# Patient Record
Sex: MALE | Race: BLACK OR AFRICAN AMERICAN | NOT HISPANIC OR LATINO | Employment: UNEMPLOYED
[De-identification: names, ages, dates, MRNs, and addresses within clinical notes are randomized per-mention and may not be internally consistent; named-entity substitution may affect disease eponyms.]

---

## 2019-02-07 ENCOUNTER — CONTACT MOVED (OUTPATIENT)
Age: 39
End: 2019-02-07

## 2022-09-19 ENCOUNTER — HOSPITAL ENCOUNTER (EMERGENCY)
Facility: CLINIC | Age: 42
Discharge: HOME | End: 2022-09-19
Attending: STUDENT IN AN ORGANIZED HEALTH CARE EDUCATION/TRAINING PROGRAM | Admitting: STUDENT IN AN ORGANIZED HEALTH CARE EDUCATION/TRAINING PROGRAM
Payer: COMMERCIAL

## 2022-09-19 VITALS
HEART RATE: 90 BPM | SYSTOLIC BLOOD PRESSURE: 138 MMHG | DIASTOLIC BLOOD PRESSURE: 83 MMHG | OXYGEN SATURATION: 100 % | TEMPERATURE: 98.3 F | RESPIRATION RATE: 18 BRPM

## 2022-09-19 DIAGNOSIS — T40.2X1A OPIOID OVERDOSE, ACCIDENTAL OR UNINTENTIONAL, INITIAL ENCOUNTER (H): ICD-10-CM

## 2022-09-19 PROCEDURE — 250N000011 HC RX IP 250 OP 636: Performed by: STUDENT IN AN ORGANIZED HEALTH CARE EDUCATION/TRAINING PROGRAM

## 2022-09-19 PROCEDURE — 99284 EMERGENCY DEPT VISIT MOD MDM: CPT | Performed by: STUDENT IN AN ORGANIZED HEALTH CARE EDUCATION/TRAINING PROGRAM

## 2022-09-19 PROCEDURE — 96372 THER/PROPH/DIAG INJ SC/IM: CPT | Performed by: STUDENT IN AN ORGANIZED HEALTH CARE EDUCATION/TRAINING PROGRAM

## 2022-09-19 RX ORDER — OLANZAPINE 10 MG/2ML
5 INJECTION, POWDER, FOR SOLUTION INTRAMUSCULAR ONCE
Status: COMPLETED | OUTPATIENT
Start: 2022-09-19 | End: 2022-09-19

## 2022-09-19 RX ADMIN — OLANZAPINE 5 MG: 10 INJECTION, POWDER, LYOPHILIZED, FOR SOLUTION INTRAMUSCULAR at 13:03

## 2022-09-19 ASSESSMENT — ACTIVITIES OF DAILY LIVING (ADL)
ADLS_ACUITY_SCORE: 35
ADLS_ACUITY_SCORE: 35

## 2022-09-19 NOTE — DISCHARGE INSTRUCTIONS
You were seen today after an opioid overdose.  Please stop using drugs.  I have prescribed you Narcan for a rescue medication.  Please follow-up with your regular doctor to discuss cessation of drug use.  Return to the emergency department with any new or worsening symptoms.

## 2022-09-19 NOTE — ED PROVIDER NOTES
[facility] [place] EMERGENCY DEPARTMENT ([facility])  9/19/22  History     Chief Complaint   Patient presents with     Drug Overdose     Overdose on opiates today; narcan given PTA     HPI  Jean Carlos Banda is a 42 year old male with a history notable for anxiety and polysubstance abuse disorder who presents to the ED via EMS for evaluation of opiate overdose.  Patient was found unresponsive at a friend's house and received intranasal Narcan by first responders.  He responded well to this.  Patient does endorse snorting fentanyl.  Denies any other alcohol or drug use.  Does not have any thoughts of harming himself or others and states this was accidental.  Denies any fevers, chills or other infectious symptoms.  He is very uncomfortable on arrival with nausea, vomiting and signs of acute opiate withdrawal.    I have reviewed the Medications, Allergies, Past Medical and Surgical History, and Social History in the [facility] system.  PAST MEDICAL HISTORY: History reviewed. No pertinent past medical history.    PAST SURGICAL HISTORY: History reviewed. No pertinent surgical history.    Past medical history, past surgical history, medications, and allergies were reviewed with the patient. Additional pertinent items: None    FAMILY HISTORY: History reviewed. No pertinent family history.    SOCIAL HISTORY:   Social History     Tobacco Use     Smoking status: Current Every Day Smoker     Packs/day: 1.00     Types: Cigarettes     Smokeless tobacco: Not on file   Substance Use Topics     Alcohol use: Not on file     Comment: Ref to answer!     Social history was reviewed with the patient. Additional pertinent items: None      Discharge Medication List as of 9/19/2022  4:21 PM      START taking these medications    Details   naloxone (NARCAN) 4 MG/0.1ML nasal spray Spray 1 spray (4 mg) into one nostril alternating nostrils as needed for opioid reversal every 2-3 minutes until assistance arrives, Disp-0.2 mL, R-0, Local Print               No Known Allergies     Review of Systems  A complete review of systems was performed with pertinent positives and negatives noted in the HPI, and all other systems negative.    Physical Exam   BP: 135/82  Pulse: 81  Temp: 98.3  F (36.8  C)  Resp: 18  SpO2: 100 %      Physical Exam  General: diaphoretic, retching  HENT: MMM, no oropharyngeal lesions  Eyes: PERRL, normal sclerae  Neck: non-tender, supple  Cardio: Regular rate. Regular rhythm. Extremities well perfused  Resp: Normal work of breathing, normal respiratory rate.  Chest/Back: no visual signs of trauma, no CVA tenderness  Abdomen: no tenderness, non-distended, no rebound, no guarding  Neuro: alert and fully oriented. CN II-XII grossly intact. Grossly normal strength and sensation in all extremities.   MSK: no deformities. Grossly normal ROM in extremities.   Integumentary/Skin: no rash visualized, normal color  Psych: cooperative    ED Course        Procedures           No results found for this or any previous visit (from the past 24 hour(s)).  Medications   OLANZapine (zyPREXA) injection 5 mg (5 mg Intramuscular Given 9/19/22 1303)             Assessments & Plan (with Medical Decision Making)   Jean Carlos Banda is a 42-year-old male with history of polysubstance use presenting to the emergency department with opioid overdose.  He received intranasal Narcan by first responders and had a good response to this.  Endorses fentanyl use.  States this was not an attempt of self-harm.  He was monitored on end-tidal for 3 and half hours after the administration of Narcan.  Advised 4 hours of observation but the patient adamantly demanding discharge at that time.  Felt he was decisional and risk was low at 3 and half hours out.  He did receive 5 of IM Zyprexa for symptoms of opiate withdrawal after his Narcan administration.  [place] much better by the time of his discharge.  Describes Narcan on discharge.  Advised follow-up with primary care provider to discuss  cessation of drugs.    I have reviewed the nursing notes.    I have reviewed the findings, diagnosis, plan and need for follow up with the patient.    Discharge Medication List as of 9/19/2022  4:21 PM      START taking these medications    Details   naloxone (NARCAN) 4 MG/0.1ML nasal spray Spray 1 spray (4 mg) into one nostril alternating nostrils as needed for opioid reversal every 2-3 minutes until assistance arrives, Disp-0.2 mL, R-0, Local Print             Final diagnoses:   Opioid overdose, accidental or unintentional, initial encounter (H)       Nikky Roe MD  9/19/2022   [facility] EMERGENCY DEPARTMENT     Nikky Roe MD  Resident  09/19/22 [id number]

## 2022-09-19 NOTE — ED TRIAGE NOTES
Pt brought in by EMS due to call of overdose at a friend's home. Per EMS, PD found pt unresponsive- 4mg Narcan given intranasal.   EMS arrived to pt having emesis, diaphoretic.   Pt wanted to be brought to [facility]. Refused PIV placement. Given 4 mg Zofran IM. Room airs saturations.     Triage Assessment     Row Name 09/19/22 8469       Triage Assessment (Adult)    Airway WDL WDL       Respiratory WDL    Respiratory WDL WDL       Skin Circulation/Temperature WDL    Skin Circulation/Temperature WDL WDL       Cardiac WDL    Cardiac WDL WDL       Peripheral/Neurovascular WDL    Peripheral Neurovascular WDL WDL       Cognitive/Neuro/Behavioral WDL    Cognitive/Neuro/Behavioral WDL X;mood/behavior    Level of Consciousness other (see comments)  upset, refusing to answer some questions    Arousal Level opens eyes spontaneously    Orientation oriented x 4    Speech spontaneous;clear    Mood/Behavior angry;agitated;restless;flat affect;withdrawn       [place] Coma Scale    Best Eye Response 4-->(E4) spontaneous    Best Motor Response 6-->(M6) obeys commands    Best Verbal Response 5-->(V5) oriented    [place] Coma Scale Score 15

## 2022-09-19 NOTE — ED NOTES
Bed: ED01  Expected date: 9/19/22  Expected time:   Means of arrival:   Comments:  Elvin 89   43 y/o narcan overdose   Awake/alert now